# Patient Record
Sex: FEMALE | Race: WHITE | Employment: UNEMPLOYED | URBAN - METROPOLITAN AREA
[De-identification: names, ages, dates, MRNs, and addresses within clinical notes are randomized per-mention and may not be internally consistent; named-entity substitution may affect disease eponyms.]

---

## 2023-10-06 ENCOUNTER — ROUTINE PRENATAL (OUTPATIENT)
Dept: OBGYN CLINIC | Age: 23
End: 2023-10-06

## 2023-10-06 VITALS
SYSTOLIC BLOOD PRESSURE: 129 MMHG | DIASTOLIC BLOOD PRESSURE: 71 MMHG | WEIGHT: 162 LBS | BODY MASS INDEX: 28.7 KG/M2 | HEIGHT: 63 IN

## 2023-10-06 DIAGNOSIS — O99.343 ANXIETY DURING PREGNANCY IN THIRD TRIMESTER, ANTEPARTUM: ICD-10-CM

## 2023-10-06 DIAGNOSIS — Z98.890 HISTORY OF BACK SURGERY: ICD-10-CM

## 2023-10-06 DIAGNOSIS — O99.283 HYPOTHYROIDISM AFFECTING PREGNANCY IN THIRD TRIMESTER: ICD-10-CM

## 2023-10-06 DIAGNOSIS — O09.93 HIGH-RISK PREGNANCY IN THIRD TRIMESTER: ICD-10-CM

## 2023-10-06 DIAGNOSIS — E03.9 HYPOTHYROIDISM AFFECTING PREGNANCY IN THIRD TRIMESTER: ICD-10-CM

## 2023-10-06 DIAGNOSIS — Z3A.38 38 WEEKS GESTATION OF PREGNANCY: ICD-10-CM

## 2023-10-06 DIAGNOSIS — O40.9XX0 POLYHYDRAMNIOS AFFECTING PREGNANCY: Primary | ICD-10-CM

## 2023-10-06 DIAGNOSIS — O40.3XX0 POLYHYDRAMNIOS AFFECTING PREGNANCY IN THIRD TRIMESTER: ICD-10-CM

## 2023-10-06 DIAGNOSIS — F41.9 ANXIETY DURING PREGNANCY IN THIRD TRIMESTER, ANTEPARTUM: ICD-10-CM

## 2023-10-06 DIAGNOSIS — Z13.32 ENCOUNTER FOR SCREENING FOR MATERNAL DEPRESSION: ICD-10-CM

## 2023-10-06 PROBLEM — F41.1 ANXIETY STATE: Status: ACTIVE | Noted: 2022-11-14

## 2023-10-06 PROBLEM — O09.90 HIGH-RISK PREGNANCY: Status: ACTIVE | Noted: 2023-10-06

## 2023-10-06 RX ORDER — CEPHALEXIN 500 MG/1
500 CAPSULE ORAL 4 TIMES DAILY
COMMUNITY

## 2023-10-06 RX ORDER — LEVOTHYROXINE SODIUM 137 UG/1
137 TABLET ORAL DAILY
COMMUNITY

## 2023-10-06 RX ORDER — ASPIRIN 81 MG/1
162 TABLET ORAL DAILY
COMMUNITY

## 2023-10-06 RX ORDER — FOLIC ACID 1 MG/1
3 TABLET ORAL DAILY
COMMUNITY

## 2023-10-06 ASSESSMENT — PATIENT HEALTH QUESTIONNAIRE - PHQ9
SUM OF ALL RESPONSES TO PHQ QUESTIONS 1-9: 0
SUM OF ALL RESPONSES TO PHQ9 QUESTIONS 1 & 2: 0
SUM OF ALL RESPONSES TO PHQ QUESTIONS 1-9: 0
1. LITTLE INTEREST OR PLEASURE IN DOING THINGS: 0
SUM OF ALL RESPONSES TO PHQ QUESTIONS 1-9: 0
SUM OF ALL RESPONSES TO PHQ QUESTIONS 1-9: 0
2. FEELING DOWN, DEPRESSED OR HOPELESS: 0

## 2023-10-06 NOTE — PROGRESS NOTES
tendon reflexes, and carpal tunnel syndrome. If left untreated, hypothyroidism will progress to myxedema coma, psychosis, and in some cases overt psychosis. Subclinical hypothyroidism is defined as an elevated TSH with normal free thyroxine index (FTI) or normal free T4 level in an asymptomatic pregnant woman. Untreated hypothyroidism is associated with an increased risk of preeclampsia. Untreated or inadequately treated hypothyroidism is associated with an increased incidence of preeclampsia and low birth weight. Women with iodine-deficient hypothyroidism are at a significant risk of having babies with congenital cretinism. Additional plans and concerns as documented in problem list.   All questions answered and concerns discussed. Return if symptoms worsen or fail to improve. Nadeen Dickerson MD   An electronic signature was used to authenticate this note. I have spent 46 minutes reviewing previous notes, test results and face to face with the patient discussing the diagnosis and importance of compliance with the treatment plan, as well as documenting on the day of the visit 10/06/2023. Ultrasound findings were discussed separately and are not included in this time calculation.

## 2023-10-06 NOTE — ASSESSMENT & PLAN NOTE
Moderate to Severe Polyhydramnios-      The most common cause of severe polyhydramnios (EZ >35cm) are fetal anomalies (often associated with an underlying genetic abnormality or syndrome), while maternal diabetes, multiple gestation, and idiopathic factors are more often associated with milder cases (EZ 25-34cm). Structural anomalies more commonly associated with this condition include tracheoesophageal fistula, esophageal/intestinal atresia or stenosis, cleft lip/palate. In addition, neuromuscular disorders which impair swallowing may result in increased amniotic fluid. Other fetal causes may be secondary to genetic abnormalities (trisomy 18, especially with IUGR; DiGeorge syndrome) or fetal hydrops. In one series of 272 richter pregnancies with polyhydramnios, approximately 1/3 were associated with a congenital anomaly and 1/4 were associated with maternal diabetes; the remaining 40% were considered idiopathic. Fetal infection, Bartter syndrome, anemia, and neuromuscular disorders account for some of these cases and should be considered in the differential diagnosis if a structural abnormality and maternal diabetes are excluded, although Bartter syndrome and neuromuscular diseases are quite rare and infection (TORCH, parvovirus) is rarely associated with isolated polyhydramnios. Other rare conditions may include placental mosaicism with triploidy. Will plan delivery in 39th week.

## 2023-10-06 NOTE — PATIENT INSTRUCTIONS
Resources for Depression/Anxiety  [employer] (PSI). PSI Warmline:  9-858-636-4PPD (7313). WWW. POSTPARTUM. NET    Mom's IMPACTT  [email or web address]     In order to optimize maternal, fetal, and  health, we recommend the following vaccinations. Flu- yearly  Consider Covid vaccination/booster  TDaP after 28 weeks each pregnancy  Consider RSV vaccine 32-36 weeks of pregnancy.   ([email or web address])